# Patient Record
Sex: FEMALE | Race: BLACK OR AFRICAN AMERICAN | NOT HISPANIC OR LATINO | Employment: UNEMPLOYED | URBAN - METROPOLITAN AREA
[De-identification: names, ages, dates, MRNs, and addresses within clinical notes are randomized per-mention and may not be internally consistent; named-entity substitution may affect disease eponyms.]

---

## 2022-11-10 DIAGNOSIS — N63.0 MASS OF BREAST, UNSPECIFIED LATERALITY: Primary | ICD-10-CM

## 2022-12-16 NOTE — PROGRESS NOTES
"[facility] [place] [facility]  Breast Surgical Oncology  New Patient Office Visit - H&P      Referring Provider: [facility]  PCP: Primary Doctor No     Chief Complaint:   Chief Complaint   Patient presents with    Breast Cyst      Subjective:     HPI:  Patsy Suarez is a 47 y.o. female who presents on 2022 for evaluation of  a left breast cyst and dermal cysts .     She first noticed 2 lumps in the skin of the left breast a few months ago. She presented to her OBGYN and was sent for evaluation with breast imaging at [facility], which revealed findings consistent with 2 dermal cysts. Since this imaging, she is also now able to palpate more along the inferior portion of her sternum. She reports a history of similar cyst on the left upper arm which has been I&Ded a few times. She currently denies other any breast issues including rashes, redness, pain, swelling, nipple discharge, or new lumps/masses.     Of note, an incidental benign cyst was also found on imaging in the left breast 2:00 9 cmfn. Her lifetime risk for breast cancer was calculated using the Tyrer-Cuzick model and found to be 14.8% (average). She does have a strong family history of colon/stomach cancer.      Imagin2022 SCR MG at [facility] - There is a 0.5 cm ovoid shaped mass in the left breast at 2-3:00 which is probably a cyst. US recommended to evaluate this. Will also evaluate patient reports of bumps in the extreme medial left breast over the sternum.  2022 L Breast US at [facility] to evaluate mass seen in the left breast on screening MG and to evaluate to "bumps" under the skin of the medial left breast - 1. There has benign cyst in the posterior left breast at 2:00 9 cm from the nipple measuring 0.7 x 0.4 x 0.9 cm corresponding to the mass seen mammographically at this location.  2.  There is a benign cyst associated with the dermis located at 8:00 12 cm from the nipple corresponding to 1 of the palpable " masses indicated by patient.  It measures 0.8 x 0.5 x 0.8 cm.  3.  There has benign appearing dermal cyst located in the midline over the sternum corresponding to the other palpable mass indicated by patient.  It measures 0.3 x 0.2 x 0.3 cm.    Pathology:  None    OB/GYN History:  Age at Menarche Onset: 12  Menopausal Status: perimenopausal, LMP: Patient's last menstrual period was 2022.  Hysterectomy/Oophorectomy: no  Hormonal birth control (duration): on and off from age 34 - 40  Pregnancy History:   Age at first live birth: n/a  Hormone Replacement Therapy: No, none    Other:  # of breast biopsies (when and pathology results): none  MG breast density: No breast composition recorded.   Prior thoracic RT: none  Genetic testing: none  Ashkenazi Catholic descent: No    Family History:  Family History   Problem Relation Age of Onset    Diabetes Mother     Hypertension Mother     Thyroid disease Mother     Hypertension Father     Diabetes Father     Cancer Maternal Grandmother         stomach or colon; 50s    Cancer Paternal Grandmother         stomach or colon; 60s or 70s    Cancer Maternal Aunt         stomach or colon; 70s    Cancer Maternal Aunt         stomach or colon; late 50s early 60s    Breast cancer Maternal Cousin         53 or 54    Cancer Paternal Cousin         Stomach or colon; 70s    Cancer Paternal Cousin         Stomach or colon; 70s or 80s    Prostate cancer Maternal Uncle         late 60s early 70s    Prostate cancer Maternal Uncle 84        Patient History:  Past Medical History:   Diagnosis Date    Arthritis     Asthma     Hypertension        Past Surgical History:   Procedure Laterality Date    OVARIAN CYST SURGERY         Social History     Socioeconomic History    Marital status:    Tobacco Use    Smoking status: Former     Types: Cigarettes    Smokeless tobacco: Never    Tobacco comments:     On and off light smoke         There is no immunization history on file for this  patient.    Medications/Allergies:    Current Outpatient Medications:     albuterol (PROVENTIL/VENTOLIN HFA) 90 mcg/actuation inhaler, Inhale 2 puffs into the lungs., Disp: , Rfl:     cholecalciferol, vitamin D3, 1,250 mcg (50,000 unit) Tab, Take 1,250 mcg by mouth., Disp: , Rfl:     clotrimazole (LOTRIMIN) 1 % cream, Apply topically., Disp: , Rfl:     fluticasone furoate-vilanteroL (BREO) 100-25 mcg/dose diskus inhaler, Inhale 1 puff into the lungs., Disp: , Rfl:     fluticasone propionate (FLONASE) 50 mcg/actuation nasal spray, 1 spray by Nasal route., Disp: , Rfl:     ibuprofen (ADVIL,MOTRIN) 800 MG tablet, Take 800 mg by mouth., Disp: , Rfl:     ketoconazole (NIZORAL) 2 % cream, Apply topically., Disp: , Rfl:     lisinopriL-hydrochlorothiazide (PRINZIDE,ZESTORETIC) 20-25 mg Tab, Take 1 tablet by mouth once daily., Disp: , Rfl:     metoprolol succinate (TOPROL-XL) 50 MG 24 hr tablet, Take 50 mg by mouth., Disp: , Rfl:     montelukast (SINGULAIR) 10 mg tablet, Take 10 mg by mouth., Disp: , Rfl:     terbinafine HCL (LAMISIL) 250 mg tablet, Take 250 mg by mouth., Disp: , Rfl:      Review of patient's allergies indicates:   Allergen Reactions    Contrast media      INTRAVASCULAR CONTRAST MEDIA       Review of Systems:  Pertinent items are noted in HPI.     Objective:     Vitals:  Vitals:    12/21/22 0947   BP: (!) 132/90   Pulse: 78   Resp: 18   Temp: 97.9 °F (36.6 °C)       Body mass index is 44.51 kg/m².     Physical Exam:  General: The patient is awake, alert and oriented times three. The patient is well nourished and in no acute distress.  Neck: There is no evidence of palpable cervical, supraclavicular or axillary adenopathy. The neck is supple. The thyroid is not enlarged.  Musculoskeletal: The patient has a normal range of motion of her bilateral upper extremities.  Chest: Examination of the chest wall fails to reveal any obvious abnormalities.  The lungs are clear to auscultation bilaterally without rales,  rhonchi, or wheezing.  Cardiovascular: The heart has a regular rate and rhythm without murmurs, gallops or rubs.  Breast:   Right: Multiple skin tags present noted to the skin of the trunk, largest on the right breast axillary tail. Examination of right breast fails to reveal any dominant masses or areas of significant focal nodularity. The nipple is everted without evidence of discharge. There is no skin dimpling with movement of the pectoralis. There is no significant skin changes overlying the breast.   Left: In the medial portion of the inframammary fold, I am able to visualize a 3-4 mm inclusion cyst. Just medial to this is an area where she reports prior lump was present. Post inflammatory hyperpigmentation noted here. Similar palpable lumps also noted at the inferior portion of the sternum consistent with inclusion cysts. Examination of the left breast fails to reveal any dominant masses or areas of significant focal nodularity. The nipple is everted without evidence of discharge. There is no skin dimpling with movement of the pectoralis. There are no significant skin changes overlying the breast.  Abdomen: The abdomen is soft, flat, nontender and nondistended with no palpable masses or organomegaly.  Integumentary: no rashes or skin lesions present  Neurologic: cranial nerves intact, no signs of peripheral neurological deficit, motor/sensory function intact    Assessment and Plan:     There is no problem list on file for this patient.       Patsy was seen today for breast cyst.    Diagnoses and all orders for this visit:    Mass of breast, unspecified laterality  -     Ambulatory referral/consult to Breast Surgery          Plan:       Referral to [facility] for skin tags and also for multiple epidermal inclusion cysts of the left breast, sternum, left upper arm.     Referral to genetic counseling for strong family history of colon cancer.    RTC in 6 months to re-evaluate risk for breast cancer  and re-evaluate breast concerns. Patient was told that if she sees both genetics and dermatology sooner than this visit, she is welcome to call and reschedule appointment to sooner date.      All of her questions were answered. She was advised to call if she develops any questions or concerns.    Ely Catherine PA-C            --------------------------------------------------------------------------------------------------------------  Total time on the date of the visit ranged from 60-74 mins ([id number]). Total time includes both face-to-face and non-face-to-face time personally spent by myself on the day of the visit.    Non-face-to-face time included:  _X_ preparing to see the patient such as reviewing the patient record  _X_ obtaining and reviewing separately obtained history  _X_ independently interpreting results  _X_ documenting clinical information in electronic health record.    Face-to-face time included:  _X_ performing an appropriate history and examination  _X_ communicating results to the patient  _X_ counseling and educating the patient  __ ordering appropriate medications  _x_ ordering appropriate tests  _X_ ordering appropriate procedures (including follow-up)  _X_ answering any questions the patient had    Total Time spent on date of visit: 60 minutes

## 2022-12-21 ENCOUNTER — OFFICE VISIT (OUTPATIENT)
Dept: SURGERY | Facility: CLINIC | Age: 47
End: 2022-12-21
Payer: OTHER GOVERNMENT

## 2022-12-21 VITALS
OXYGEN SATURATION: 97 % | SYSTOLIC BLOOD PRESSURE: 132 MMHG | DIASTOLIC BLOOD PRESSURE: 90 MMHG | WEIGHT: 284.19 LBS | TEMPERATURE: 98 F | BODY MASS INDEX: 44.61 KG/M2 | HEIGHT: 67 IN | HEART RATE: 78 BPM | RESPIRATION RATE: 18 BRPM

## 2022-12-21 DIAGNOSIS — N60.02 BENIGN CYST OF LEFT BREAST: Primary | ICD-10-CM

## 2022-12-21 DIAGNOSIS — L72.9 GENERALIZED SKIN CYSTS: ICD-10-CM

## 2022-12-21 DIAGNOSIS — Z80.0 FAMILY HISTORY OF COLON CANCER: ICD-10-CM

## 2022-12-21 DIAGNOSIS — L72.0 INCLUSION CYST OF LEFT BREAST: ICD-10-CM

## 2022-12-21 PROCEDURE — 99214 OFFICE O/P EST MOD 30 MIN: CPT | Mod: PBBFAC | Performed by: PHYSICIAN ASSISTANT

## 2022-12-21 PROCEDURE — 99205 OFFICE O/P NEW HI 60 MIN: CPT | Mod: S$PBB,,, | Performed by: PHYSICIAN ASSISTANT

## 2022-12-21 PROCEDURE — 99205 PR OFFICE/OUTPT VISIT, NEW, LEVL V, 60-74 MIN: ICD-10-PCS | Mod: S$PBB,,, | Performed by: PHYSICIAN ASSISTANT

## 2022-12-21 PROCEDURE — 99999 PR PBB SHADOW E&M-EST. PATIENT-LVL IV: CPT | Mod: PBBFAC,,, | Performed by: PHYSICIAN ASSISTANT

## 2022-12-21 PROCEDURE — 99999 PR PBB SHADOW E&M-EST. PATIENT-LVL IV: ICD-10-PCS | Mod: PBBFAC,,, | Performed by: PHYSICIAN ASSISTANT

## 2022-12-21 RX ORDER — LISINOPRIL AND HYDROCHLOROTHIAZIDE 20; 25 MG/1; MG/1
1 TABLET ORAL EVERY MORNING
COMMUNITY

## 2022-12-21 RX ORDER — KETOCONAZOLE 20 MG/G
CREAM TOPICAL
COMMUNITY
Start: 2022-08-30

## 2022-12-21 RX ORDER — FLUTICASONE FUROATE AND VILANTEROL 100; 25 UG/1; UG/1
1 POWDER RESPIRATORY (INHALATION) EVERY MORNING
COMMUNITY
Start: 2022-10-10 | End: 2023-10-10

## 2022-12-21 RX ORDER — MONTELUKAST SODIUM 10 MG/1
10 TABLET ORAL NIGHTLY
COMMUNITY
Start: 2022-10-10 | End: 2023-04-08

## 2022-12-21 RX ORDER — IBUPROFEN 800 MG/1
800 TABLET ORAL EVERY 6 HOURS PRN
COMMUNITY

## 2022-12-21 RX ORDER — ALBUTEROL SULFATE 90 UG/1
2 AEROSOL, METERED RESPIRATORY (INHALATION) EVERY 6 HOURS PRN
COMMUNITY
Start: 2022-10-10 | End: 2023-04-20

## 2022-12-21 RX ORDER — CHOLECALCIFEROL (VITAMIN D3) 1250 MCG
1250 TABLET ORAL
COMMUNITY

## 2022-12-21 RX ORDER — FLUTICASONE PROPIONATE 50 MCG
1 SPRAY, SUSPENSION (ML) NASAL DAILY PRN
COMMUNITY
Start: 2022-10-10 | End: 2023-04-08

## 2022-12-21 RX ORDER — CLOTRIMAZOLE 1 %
CREAM (GRAM) TOPICAL 2 TIMES DAILY
COMMUNITY

## 2022-12-21 RX ORDER — METOPROLOL SUCCINATE 50 MG/1
50 TABLET, EXTENDED RELEASE ORAL NIGHTLY
COMMUNITY

## 2022-12-21 RX ORDER — TERBINAFINE HYDROCHLORIDE 250 MG/1
250 TABLET ORAL
COMMUNITY

## 2023-01-17 ENCOUNTER — TELEPHONE (OUTPATIENT)
Dept: HEMATOLOGY/ONCOLOGY | Facility: CLINIC | Age: 48
End: 2023-01-17
Payer: OTHER GOVERNMENT

## 2023-01-17 NOTE — TELEPHONE ENCOUNTER
I have called pt and left messages on 1/12/23, 1/13/23, 1/17/23. I would be happy schedule the pt to be seen for genetic counseling when she returns my call. My direct phone number is [phone].    Shanice Mcgrath [facility]

## 2023-03-15 DIAGNOSIS — N60.02 SINGLE CYST OF LEFT BREAST: Primary | ICD-10-CM

## 2023-03-16 ENCOUNTER — CLINICAL SUPPORT (OUTPATIENT)
Dept: RESPIRATORY THERAPY | Facility: HOSPITAL | Age: 48
End: 2023-03-16
Attending: SURGERY
Payer: OTHER GOVERNMENT

## 2023-03-16 ENCOUNTER — HOSPITAL ENCOUNTER (OUTPATIENT)
Dept: RADIOLOGY | Facility: HOSPITAL | Age: 48
Discharge: HOME | End: 2023-03-16
Attending: SURGERY
Payer: OTHER GOVERNMENT

## 2023-03-16 DIAGNOSIS — N60.02 SINGLE CYST OF LEFT BREAST: ICD-10-CM

## 2023-03-16 DIAGNOSIS — Z01.811 PRE-OP CHEST EXAM: ICD-10-CM

## 2023-03-16 PROCEDURE — 93010 EKG 12-LEAD: ICD-10-PCS | Mod: ,,, | Performed by: INTERNAL MEDICINE

## 2023-03-16 PROCEDURE — 71046 X-RAY EXAM CHEST 2 VIEWS: CPT | Mod: TC

## 2023-03-16 PROCEDURE — 93005 ELECTROCARDIOGRAM TRACING: CPT

## 2023-03-16 PROCEDURE — 93010 ELECTROCARDIOGRAM REPORT: CPT | Mod: ,,, | Performed by: INTERNAL MEDICINE

## 2023-03-16 RX ORDER — PROGESTERONE 100 MG/1
1 CAPSULE ORAL NIGHTLY
COMMUNITY
Start: 2023-03-08

## 2023-03-16 RX ORDER — GABAPENTIN 100 MG/1
100 CAPSULE ORAL 3 TIMES DAILY PRN
COMMUNITY
Start: 2023-02-16

## 2023-03-19 ENCOUNTER — ANESTHESIA EVENT (OUTPATIENT)
Dept: SURGERY | Facility: HOSPITAL | Age: 48
End: 2023-03-19
Payer: OTHER GOVERNMENT

## 2023-03-19 NOTE — ANESTHESIA PREPROCEDURE EVALUATION
03/19/2023  Patsy Suarez is a 47 y.o., female.      Pre-op Assessment    I have reviewed the Patient Summary Reports.     I have reviewed the Nursing Notes. I have reviewed the NPO Status.   I have reviewed the Medications.     Review of Systems  Anesthesia Hx:  Denies Family Hx of Anesthesia complications.   Denies Personal Hx of Anesthesia complications.   Social:  Former Smoker, Alcohol Use    Hematology/Oncology:  Hematology Normal   Oncology Normal     EENT/Dental:EENT/Dental Normal   Cardiovascular:   Hypertension ECG has been reviewed. Has had negative cardiac workup in the last two years. Denies CP extreme SOB with exertion. Can do  4METS Functional Capacity 4 METS    Pulmonary:   Asthma moderate Sleep Apnea    Renal/:  Renal/ Normal     Hepatic/GI:  Hepatic/GI Normal    Musculoskeletal:  Musculoskeletal Normal    Neurological:  Neurology Normal    Endocrine:  Endocrine Normal    Dermatological:  Skin Normal    Psych:  Psychiatric Normal           Physical Exam  General: Cooperative, Alert and Oriented  Morbid Obesity  Airway:  Mallampati: II   Mouth Opening: Normal  TM Distance: Normal  Tongue: Normal  Neck ROM: Normal ROM    Dental:  Intact        Anesthesia Plan  Type of Anesthesia, risks & benefits discussed:    Anesthesia Type: Gen Supraglottic Airway  Intra-op Monitoring Plan: Standard ASA Monitors  Post Op Pain Control Plan: multimodal analgesia  Induction:  IV  Informed Consent: Informed consent signed with the Patient and all parties understand the risks and agree with anesthesia plan.  All questions answered. Patient consented to blood products? Yes  ASA Score: 2    Ready For Surgery From Anesthesia Perspective.     .

## 2023-03-20 ENCOUNTER — HOSPITAL ENCOUNTER (OUTPATIENT)
Facility: HOSPITAL | Age: 48
Discharge: HOME | End: 2023-03-20
Attending: SURGERY | Admitting: SURGERY
Payer: OTHER GOVERNMENT

## 2023-03-20 ENCOUNTER — ANESTHESIA (OUTPATIENT)
Dept: SURGERY | Facility: HOSPITAL | Age: 48
End: 2023-03-20
Payer: OTHER GOVERNMENT

## 2023-03-20 VITALS
WEIGHT: 286 LBS | DIASTOLIC BLOOD PRESSURE: 78 MMHG | HEART RATE: 68 BPM | OXYGEN SATURATION: 98 % | BODY MASS INDEX: 44.79 KG/M2 | TEMPERATURE: 97 F | RESPIRATION RATE: 20 BRPM | SYSTOLIC BLOOD PRESSURE: 138 MMHG

## 2023-03-20 DIAGNOSIS — N60.02 SINGLE CYST OF LEFT BREAST: ICD-10-CM

## 2023-03-20 DIAGNOSIS — N60.02 CYST OF LEFT BREAST: Primary | ICD-10-CM

## 2023-03-20 LAB — B-HCG SERPL QL: NEGATIVE

## 2023-03-20 PROCEDURE — 81025 URINE PREGNANCY TEST: CPT | Performed by: SURGERY

## 2023-03-20 PROCEDURE — 36000706: Performed by: SURGERY

## 2023-03-20 PROCEDURE — 71000015 HC POSTOP RECOV 1ST HR: Performed by: SURGERY

## 2023-03-20 PROCEDURE — 25000003 PHARM REV CODE 250: Performed by: SURGERY

## 2023-03-20 PROCEDURE — 63600175 PHARM REV CODE 636 W HCPCS: Performed by: ANESTHESIOLOGY

## 2023-03-20 PROCEDURE — 36000707: Performed by: SURGERY

## 2023-03-20 PROCEDURE — 63600175 PHARM REV CODE 636 W HCPCS: Performed by: SURGERY

## 2023-03-20 PROCEDURE — 37000009 HC ANESTHESIA EA ADD 15 MINS: Performed by: SURGERY

## 2023-03-20 PROCEDURE — 00400 ANES INTEGUMENTARY SYS NOS: CPT | Performed by: SURGERY

## 2023-03-20 PROCEDURE — 63600175 PHARM REV CODE 636 W HCPCS: Performed by: NURSE ANESTHETIST, CERTIFIED REGISTERED

## 2023-03-20 PROCEDURE — 37000008 HC ANESTHESIA 1ST 15 MINUTES: Performed by: SURGERY

## 2023-03-20 PROCEDURE — 71000016 HC POSTOP RECOV ADDL HR: Performed by: SURGERY

## 2023-03-20 PROCEDURE — 71000033 HC RECOVERY, INTIAL HOUR: Performed by: SURGERY

## 2023-03-20 PROCEDURE — 63600175 PHARM REV CODE 636 W HCPCS

## 2023-03-20 PROCEDURE — 25000003 PHARM REV CODE 250

## 2023-03-20 RX ORDER — METOCLOPRAMIDE HYDROCHLORIDE 5 MG/ML
INJECTION INTRAMUSCULAR; INTRAVENOUS
Status: DISCONTINUED | OUTPATIENT
Start: 2023-03-20 | End: 2023-03-20

## 2023-03-20 RX ORDER — CEFAZOLIN SODIUM 2 G/50ML
2 SOLUTION INTRAVENOUS
Status: COMPLETED | OUTPATIENT
Start: 2023-03-20 | End: 2023-03-20

## 2023-03-20 RX ORDER — DEXAMETHASONE SODIUM PHOSPHATE 4 MG/ML
INJECTION, SOLUTION INTRA-ARTICULAR; INTRALESIONAL; INTRAMUSCULAR; INTRAVENOUS; SOFT TISSUE
Status: DISCONTINUED | OUTPATIENT
Start: 2023-03-20 | End: 2023-03-20

## 2023-03-20 RX ORDER — PROPOFOL 10 MG/ML
INJECTION, EMULSION INTRAVENOUS
Status: DISCONTINUED | OUTPATIENT
Start: 2023-03-20 | End: 2023-03-20

## 2023-03-20 RX ORDER — LIDOCAINE HCL/PF 100 MG/5ML
SYRINGE (ML) INTRAVENOUS
Status: DISCONTINUED | OUTPATIENT
Start: 2023-03-20 | End: 2023-03-20

## 2023-03-20 RX ORDER — MIDAZOLAM HYDROCHLORIDE 1 MG/ML
INJECTION INTRAMUSCULAR; INTRAVENOUS
Status: DISCONTINUED | OUTPATIENT
Start: 2023-03-20 | End: 2023-03-20

## 2023-03-20 RX ORDER — HYDROMORPHONE HYDROCHLORIDE 2 MG/ML
0.2 INJECTION, SOLUTION INTRAMUSCULAR; INTRAVENOUS; SUBCUTANEOUS EVERY 5 MIN PRN
Status: DISCONTINUED | OUTPATIENT
Start: 2023-03-20 | End: 2023-03-20

## 2023-03-20 RX ORDER — FENTANYL CITRATE 50 UG/ML
INJECTION, SOLUTION INTRAMUSCULAR; INTRAVENOUS
Status: DISCONTINUED | OUTPATIENT
Start: 2023-03-20 | End: 2023-03-20

## 2023-03-20 RX ORDER — BUPIVACAINE HYDROCHLORIDE 5 MG/ML
INJECTION, SOLUTION EPIDURAL; INTRACAUDAL
Status: DISCONTINUED | OUTPATIENT
Start: 2023-03-20 | End: 2023-03-20 | Stop reason: HOSPADM

## 2023-03-20 RX ORDER — SODIUM CHLORIDE, SODIUM LACTATE, POTASSIUM CHLORIDE, CALCIUM CHLORIDE 600; 310; 30; 20 MG/100ML; MG/100ML; MG/100ML; MG/100ML
INJECTION, SOLUTION INTRAVENOUS CONTINUOUS
Status: DISCONTINUED | OUTPATIENT
Start: 2023-03-20 | End: 2023-03-20 | Stop reason: HOSPADM

## 2023-03-20 RX ORDER — ONDANSETRON 2 MG/ML
INJECTION INTRAMUSCULAR; INTRAVENOUS
Status: DISCONTINUED | OUTPATIENT
Start: 2023-03-20 | End: 2023-03-20

## 2023-03-20 RX ORDER — SODIUM CHLORIDE 0.9 % (FLUSH) 0.9 %
10 SYRINGE (ML) INJECTION
Status: DISCONTINUED | OUTPATIENT
Start: 2023-03-20 | End: 2023-03-20 | Stop reason: HOSPADM

## 2023-03-20 RX ORDER — FENTANYL CITRATE 50 UG/ML
25 INJECTION, SOLUTION INTRAMUSCULAR; INTRAVENOUS EVERY 5 MIN PRN
Status: DISCONTINUED | OUTPATIENT
Start: 2023-03-20 | End: 2023-03-20

## 2023-03-20 RX ORDER — OXYCODONE AND ACETAMINOPHEN 5; 325 MG/1; MG/1
1 TABLET ORAL EVERY 4 HOURS PRN
Status: DISCONTINUED | OUTPATIENT
Start: 2023-03-20 | End: 2023-03-20 | Stop reason: HOSPADM

## 2023-03-20 RX ADMIN — PROPOFOL 150 MG: 10 INJECTION, EMULSION INTRAVENOUS at 08:03

## 2023-03-20 RX ADMIN — FENTANYL CITRATE 25 MCG: 50 INJECTION, SOLUTION INTRAMUSCULAR; INTRAVENOUS at 09:03

## 2023-03-20 RX ADMIN — SODIUM CHLORIDE, POTASSIUM CHLORIDE, SODIUM LACTATE AND CALCIUM CHLORIDE: 600; 310; 30; 20 INJECTION, SOLUTION INTRAVENOUS at 09:03

## 2023-03-20 RX ADMIN — Medication 100 MG: at 08:03

## 2023-03-20 RX ADMIN — FENTANYL CITRATE 50 MCG: 50 INJECTION, SOLUTION INTRAMUSCULAR; INTRAVENOUS at 09:03

## 2023-03-20 RX ADMIN — CEFAZOLIN SODIUM 2 G: 2 SOLUTION INTRAVENOUS at 08:03

## 2023-03-20 RX ADMIN — DEXAMETHASONE SODIUM PHOSPHATE 8 MG: 4 INJECTION, SOLUTION INTRA-ARTICULAR; INTRALESIONAL; INTRAMUSCULAR; INTRAVENOUS; SOFT TISSUE at 08:03

## 2023-03-20 RX ADMIN — SODIUM CHLORIDE, POTASSIUM CHLORIDE, SODIUM LACTATE AND CALCIUM CHLORIDE: 600; 310; 30; 20 INJECTION, SOLUTION INTRAVENOUS at 07:03

## 2023-03-20 RX ADMIN — MIDAZOLAM HYDROCHLORIDE 2 MG: 1 INJECTION INTRAMUSCULAR; INTRAVENOUS at 08:03

## 2023-03-20 RX ADMIN — OXYCODONE HYDROCHLORIDE AND ACETAMINOPHEN 1 TABLET: 5; 325 TABLET ORAL at 10:03

## 2023-03-20 RX ADMIN — PROPOFOL 100 MG: 10 INJECTION, EMULSION INTRAVENOUS at 09:03

## 2023-03-20 RX ADMIN — ONDANSETRON 4 MG: 2 INJECTION INTRAMUSCULAR; INTRAVENOUS at 08:03

## 2023-03-20 RX ADMIN — METOCLOPRAMIDE 10 MG: 5 INJECTION, SOLUTION INTRAMUSCULAR; INTRAVENOUS at 08:03

## 2023-03-20 NOTE — OR NURSING
Attempted to call family in patient's room. No answer. Message left with Sandra at 2nd floor nurses station.

## 2023-03-20 NOTE — ANESTHESIA POSTPROCEDURE EVALUATION
Anesthesia Post Evaluation    Patient: Patsy Suarez    Procedure(s) Performed: Procedure(s) (LRB):  EXCISION, LESION, BREAST (left breast cystic lesion inframammary fold) (Left)  REMOVAL, SKIN TAG - LEFT BREAST SKIN TAG, INFERIOR BREAST (Left)  EXCISION, LESION - LEFT BREAST INFERIOR LESION (Left)    Final Anesthesia Type: general      Patient location during evaluation: PACU  Patient participation: Yes- Able to Participate  Level of consciousness: awake  Post-procedure vital signs: reviewed and stable  Pain management: adequate  Airway patency: patent  TEVIN mitigation strategies: Multimodal analgesia  PONV status at discharge: No PONV  Anesthetic complications: no      Cardiovascular status: hemodynamically stable  Respiratory status: unassisted, room air and spontaneous ventilation  Hydration status: euvolemic  Follow-up not needed.          Vitals Value Taken Time   /65 03/20/23 0955   Temp 36.3 °C (97.3 °F) 03/20/23 0945   Pulse 70 03/20/23 0955   Resp 23 03/20/23 0955   SpO2 97 % 03/20/23 0955   Vitals shown include unvalidated device data.      Event Time   Out of Recovery 09:57:25         Pain/Nicole Score: Nicole Score: 10 (3/20/2023  9:53 AM)

## 2023-03-20 NOTE — TRANSFER OF CARE
Anesthesia Transfer of Care Note    Patient: Patsy Suarez    Procedure(s) Performed: Procedure(s) (LRB):  EXCISION, LESION, BREAST (left breast cystic lesion inframammary fold) (Left)  REMOVAL, SKIN TAG - LEFT BREAST SKIN TAG, INFERIOR BREAST (Left)  EXCISION, LESION - LEFT BREAST INFERIOR LESION (Left)    Patient location: PACU    Anesthesia Type: general    Transport from OR: Transported from OR on room air with adequate spontaneous ventilation    Post pain: adequate analgesia    Post assessment: no apparent anesthetic complications    Post vital signs: stable    Level of consciousness: awake    Nausea/Vomiting: no nausea/vomiting    Complications: none    Transfer of care protocol was followed      Last vitals:   Visit Vitals  BP (!) 156/94   Pulse 72   Temp 36.8 °C (98.3 °F) (Oral)   Resp 20   Wt 129.7 kg (286 lb)   LMP  (Approximate)   SpO2 98%   Breastfeeding No   BMI 44.79 kg/m²

## 2023-03-22 LAB — PSYCHE PATHOLOGY RESULT: NORMAL

## 2023-04-11 NOTE — DISCHARGE SUMMARY
[facility]  Discharge Note  Short Stay    Procedure(s) (LRB):  EXCISION, LESION, BREAST (left breast cystic lesion inframammary fold) (Left)  REMOVAL, SKIN TAG - LEFT BREAST SKIN TAG, INFERIOR BREAST (Left)  EXCISION, LESION - LEFT BREAST INFERIOR LESION (Left)      OUTCOME: Patient tolerated treatment/procedure well without complication and is now ready for discharge.    DISPOSITION: Home or Self Care    FINAL DIAGNOSIS:  <principal problem not specified>    FOLLOWUP: In clinic    DISCHARGE INSTRUCTIONS:    Discharge Procedure Orders   Notify your health care provider if you experience any of the following:  temperature >100.4     Notify your health care provider if you experience any of the following:  persistent nausea and vomiting or diarrhea     Notify your health care provider if you experience any of the following:  severe uncontrolled pain     Notify your health care provider if you experience any of the following:  redness, tenderness, or signs of infection (pain, swelling, redness, odor or green/yellow discharge around incision site)     Lifting restrictions   Order Comments: No lifting more than 5 pounds for 2 weeks     Activity as tolerated     Shower on day dressing removed (No bath)   Order Comments: May shower tomorrow         Clinical Reference Documents Added to Patient Instructions         Document    WOUND CARE ED (ENGLISH)            TIME SPENT ON DISCHARGE: 3 minutes

## 2023-04-20 ENCOUNTER — CLINICAL SUPPORT (OUTPATIENT)
Dept: RESPIRATORY THERAPY | Facility: HOSPITAL | Age: 48
End: 2023-04-20
Attending: ANESTHESIOLOGY
Payer: OTHER GOVERNMENT

## 2023-04-20 DIAGNOSIS — Z12.11 SCREEN FOR COLON CANCER: Primary | ICD-10-CM

## 2023-04-20 DIAGNOSIS — Z12.11 SCREEN FOR COLON CANCER: ICD-10-CM

## 2023-04-20 PROCEDURE — 93005 ELECTROCARDIOGRAM TRACING: CPT

## 2023-04-20 PROCEDURE — 93010 ELECTROCARDIOGRAM REPORT: CPT | Mod: ,,, | Performed by: INTERNAL MEDICINE

## 2023-04-20 PROCEDURE — 93010 EKG 12-LEAD: ICD-10-PCS | Mod: ,,, | Performed by: INTERNAL MEDICINE

## 2023-04-20 RX ORDER — MONTELUKAST SODIUM 10 MG/1
10 TABLET ORAL NIGHTLY
COMMUNITY
Start: 2023-04-04

## 2023-04-20 RX ORDER — ALBUTEROL SULFATE 1.25 MG/3ML
1.25 SOLUTION RESPIRATORY (INHALATION) EVERY 6 HOURS PRN
COMMUNITY

## 2023-04-21 ENCOUNTER — ANESTHESIA EVENT (OUTPATIENT)
Dept: SURGERY | Facility: HOSPITAL | Age: 48
End: 2023-04-21
Payer: OTHER GOVERNMENT

## 2023-04-21 NOTE — ANESTHESIA PREPROCEDURE EVALUATION
04/21/2023  Patsy Suarez is a 47 y.o., female.      Pre-op Assessment    I have reviewed the Patient Summary Reports.     I have reviewed the Nursing Notes. I have reviewed the NPO Status.   I have reviewed the Medications.     Review of Systems  Anesthesia Hx:  Denies Family Hx of Anesthesia complications.   Denies Personal Hx of Anesthesia complications.   Social:  Former Smoker, Alcohol Use    Hematology/Oncology:  Hematology Normal   Oncology Normal     EENT/Dental:EENT/Dental Normal   Cardiovascular:   Hypertension, well controlled ECG has been reviewed.    Pulmonary:   Asthma moderate Sleep Apnea    Renal/:  Renal/ Normal     Hepatic/GI:  Hepatic/GI Normal    Musculoskeletal:  Musculoskeletal Normal    Neurological:  Neurology Normal    Endocrine:  Endocrine Normal    Dermatological:  Skin Normal    Psych:  Psychiatric Normal           Physical Exam  General: Cooperative, Alert and Oriented    Airway:  Mallampati: II   Mouth Opening: Normal  TM Distance: Normal  Tongue: Normal  Neck ROM: Normal ROM    Dental:  Intact        Anesthesia Plan  Type of Anesthesia, risks & benefits discussed:    Anesthesia Type: Gen Natural Airway  Intra-op Monitoring Plan: Standard ASA Monitors  Post Op Pain Control Plan:   (medical reason for not using multimodal pain management)  Induction:  IV  Informed Consent: Informed consent signed with the Patient and all parties understand the risks and agree with anesthesia plan.  All questions answered. Patient consented to blood products? Yes  ASA Score: 3    Ready For Surgery From Anesthesia Perspective.     .

## 2023-04-24 ENCOUNTER — ANESTHESIA (OUTPATIENT)
Dept: SURGERY | Facility: HOSPITAL | Age: 48
End: 2023-04-24
Payer: OTHER GOVERNMENT

## 2023-04-26 ENCOUNTER — HOSPITAL ENCOUNTER (OUTPATIENT)
Facility: HOSPITAL | Age: 48
Discharge: HOME | End: 2023-04-26
Attending: SURGERY | Admitting: SURGERY
Payer: OTHER GOVERNMENT

## 2023-04-26 VITALS
SYSTOLIC BLOOD PRESSURE: 132 MMHG | TEMPERATURE: 99 F | BODY MASS INDEX: 44.95 KG/M2 | HEART RATE: 88 BPM | OXYGEN SATURATION: 97 % | RESPIRATION RATE: 18 BRPM | DIASTOLIC BLOOD PRESSURE: 73 MMHG | WEIGHT: 287 LBS

## 2023-04-26 DIAGNOSIS — Z12.11 SCREEN FOR COLON CANCER: ICD-10-CM

## 2023-04-26 DIAGNOSIS — Z12.11 SCREENING FOR COLON CANCER: Primary | ICD-10-CM

## 2023-04-26 LAB — B-HCG SERPL QL: NEGATIVE

## 2023-04-26 PROCEDURE — 63600175 PHARM REV CODE 636 W HCPCS: Performed by: NURSE ANESTHETIST, CERTIFIED REGISTERED

## 2023-04-26 PROCEDURE — 45380 COLONOSCOPY AND BIOPSY: CPT | Mod: PT | Performed by: SURGERY

## 2023-04-26 PROCEDURE — D9220A PRA ANESTHESIA: Mod: 33,,, | Performed by: NURSE ANESTHETIST, CERTIFIED REGISTERED

## 2023-04-26 PROCEDURE — 27201423 OPTIME MED/SURG SUP & DEVICES STERILE SUPPLY: Performed by: SURGERY

## 2023-04-26 PROCEDURE — 63600175 PHARM REV CODE 636 W HCPCS: Performed by: ANESTHESIOLOGY

## 2023-04-26 PROCEDURE — 81025 URINE PREGNANCY TEST: CPT | Performed by: SURGERY

## 2023-04-26 PROCEDURE — 00811 ANES LWR INTST NDSC NOS: CPT | Performed by: SURGERY

## 2023-04-26 PROCEDURE — 37000008 HC ANESTHESIA 1ST 15 MINUTES: Performed by: SURGERY

## 2023-04-26 PROCEDURE — 25000003 PHARM REV CODE 250: Performed by: NURSE ANESTHETIST, CERTIFIED REGISTERED

## 2023-04-26 PROCEDURE — D9220A PRA ANESTHESIA: ICD-10-PCS | Mod: 33,,, | Performed by: NURSE ANESTHETIST, CERTIFIED REGISTERED

## 2023-04-26 PROCEDURE — 37000009 HC ANESTHESIA EA ADD 15 MINS: Performed by: SURGERY

## 2023-04-26 RX ORDER — SODIUM CHLORIDE, SODIUM LACTATE, POTASSIUM CHLORIDE, CALCIUM CHLORIDE 600; 310; 30; 20 MG/100ML; MG/100ML; MG/100ML; MG/100ML
INJECTION, SOLUTION INTRAVENOUS CONTINUOUS
Status: ACTIVE | OUTPATIENT
Start: 2023-04-26

## 2023-04-26 RX ORDER — PROPOFOL 10 MG/ML
VIAL (ML) INTRAVENOUS
Status: DISCONTINUED | OUTPATIENT
Start: 2023-04-26 | End: 2023-04-26

## 2023-04-26 RX ORDER — GLYCOPYRROLATE 0.2 MG/ML
INJECTION INTRAMUSCULAR; INTRAVENOUS
Status: DISCONTINUED | OUTPATIENT
Start: 2023-04-26 | End: 2023-04-26

## 2023-04-26 RX ORDER — LIDOCAINE HYDROCHLORIDE 20 MG/ML
INJECTION INTRAVENOUS
Status: DISCONTINUED | OUTPATIENT
Start: 2023-04-26 | End: 2023-04-26

## 2023-04-26 RX ADMIN — LIDOCAINE HYDROCHLORIDE 20 MG: 20 INJECTION, SOLUTION INTRAVENOUS at 12:04

## 2023-04-26 RX ADMIN — PROPOFOL 50 MG: 10 INJECTION, EMULSION INTRAVENOUS at 01:04

## 2023-04-26 RX ADMIN — PROPOFOL 150 MG: 10 INJECTION, EMULSION INTRAVENOUS at 12:04

## 2023-04-26 RX ADMIN — PROPOFOL 20 MG: 10 INJECTION, EMULSION INTRAVENOUS at 12:04

## 2023-04-26 RX ADMIN — PROPOFOL 50 MG: 10 INJECTION, EMULSION INTRAVENOUS at 12:04

## 2023-04-26 RX ADMIN — GLYCOPYRROLATE 0.2 MG: 0.2 INJECTION INTRAMUSCULAR; INTRAVENOUS at 12:04

## 2023-04-26 RX ADMIN — SODIUM CHLORIDE, POTASSIUM CHLORIDE, SODIUM LACTATE AND CALCIUM CHLORIDE: 600; 310; 30; 20 INJECTION, SOLUTION INTRAVENOUS at 10:04

## 2023-04-26 NOTE — ANESTHESIA POSTPROCEDURE EVALUATION
Anesthesia Post Evaluation    Patient: Patsy Suarez    Procedure(s) Performed: Procedure(s) (LRB):  COLONOSCOPY (N/A)  COLONOSCOPY, WITH 1 OR MORE BIOPSIES    Final Anesthesia Type: general      Patient location during evaluation: OPS  Patient participation: Yes- Able to Participate  Level of consciousness: awake and alert  Post-procedure vital signs: reviewed and stable  Pain management: adequate  Airway patency: patent  TEVIN mitigation strategies: Multimodal analgesia  PONV status at discharge: No PONV  Anesthetic complications: no      Cardiovascular status: hemodynamically stable  Respiratory status: unassisted, spontaneous ventilation and room air  Hydration status: euvolemic  Follow-up not needed.  Comments: Patient to bed per self          Vitals Value Taken Time   /83 04/26/23 1016   Temp 37.2 °C (99 °F) 04/26/23 1016   Pulse 90 04/26/23 1016   Resp 18 04/26/23 1020   SpO2 97 % 04/26/23 1016         No case tracking events are documented in the log.      Pain/Nicole Score: No data recorded

## 2023-04-27 NOTE — OP NOTE
[facility]                     [address] [address]    PATIENT NAME:      JOSE JUAN RIVERA   YOB: 1975  CSN:               [id number]  MRN:               [id number]  ADMIT DATE:        04/26/2023 10:03:00  PHYSICIAN:         Mary Quintero MD                          OPERATIVE REPORT      DATE OF SURGERY:    04/26/2023 00:00:00    SURGEON:  Mary Quintero MD    PREOPERATIVE DIAGNOSES:    1. Family history of colon cancer in her father.  2. Need for age-appropriate screening colonoscopy.  3. Morbid obesity with BMI of 44.95.  4. Hidradenitis to the inframammary fold.    POSTOPERATIVE DIAGNOSES:    1. Family history of colon cancer in her father.  2. Need for age-appropriate screening colonoscopy.  3. Morbid obesity with BMI of 44.95.  4. Hidradenitis to the inframammary fold.    PROCEDURE:  Colonoscopy with biopsy, intubation of terminal ileum.    ASSIST:  OR staff.    COMPLICATIONS:  None.    FINDINGS:    1. Digital rectal exam with grade 3 internal hemorrhoids without complication.    No mass lesions palpable.  2. Scope was advanced to the cecum.  Appendiceal orifice and ileocecal valve   were both normal appearing terminal ileum intubated for about 5 cm and was   normal appearing here.  3. There was some very mild diffuse nodularity throughout most of the entirety   of the colon extending from the ascending colon throughout.  Biopsy was taken at   the hepatic flexure times multiple.  There were no distinct polyps throughout   the entirety of the colon.  The cecum, ascending colon, transverse colon,   descending, and sigmoid colon were normal, but for the nodularity noted.  The   rectum was also normal.  Retroflexion in the rectum demonstrated hemorrhoidal   bundles in appropriate position.    OPERATIVE REPORT:  The patient was brought to the endoscopy suite, placed in   left lateral decubitus  position.  Sedation was provided by IV.  Digital rectal   exam was performed with findings as noted above.  The scope was advanced through   the anorectum into the colon up to the cecum.  The appendiceal orifice and   ileocecal valves were both identified.  The ileocecal valve was intubated for   about 5 cm and this was normal appearing.  Otherwise, the scope was retrieved to   the ascending colon.  The cecum and ascending colon were evaluated closely with   the walls under distention.  There was some nodularity of the mucosa of the   entirety of the colon beginning in the ascending colon extending throughout the   rest of the distal most aspect of the colon.  Biopsy was taken of the hepatic   flexure with cold forceps times multiple.  The scope was slowly retrieved.  No   additional pathology could be identified.  There were no distinct polyps that   were identified throughout.  The scope was brought eventually into the rectum   which was normal appearing.  Retroflexion demonstrated hemorrhoidal bundles in   appropriate position.  The scope was ultimately retrieved.  The patient   tolerated procedure well.  There were no complications.        ______________________________  MD LAURA Astudillo/JOSTIN  DD:  04/26/2023  Time:  01:21PM  DT:  04/26/2023  Time:  07:47PM  Job #:  [id number]      OPERATIVE REPORT

## 2023-04-28 LAB — PSYCHE PATHOLOGY RESULT: NORMAL

## 2023-05-10 NOTE — DISCHARGE SUMMARY
[facility]  Discharge Note  Short Stay    Procedure(s) (LRB):  COLONOSCOPY (N/A)  COLONOSCOPY, WITH 1 OR MORE BIOPSIES      OUTCOME: Patient tolerated treatment/procedure well without complication and is now ready for discharge.    DISPOSITION: Home or Self Care    FINAL DIAGNOSIS:  Screen for colon cancer    FOLLOWUP: In clinic    DISCHARGE INSTRUCTIONS:    Discharge Procedure Orders   Diet Adult Regular     Notify your health care provider if you experience any of the following:  temperature >100.4     Notify your health care provider if you experience any of the following:  persistent nausea and vomiting or diarrhea     Notify your health care provider if you experience any of the following:  severe uncontrolled pain     Notify your health care provider if you experience any of the following:  redness, tenderness, or signs of infection (pain, swelling, redness, odor or green/yellow discharge around incision site)     Activity as tolerated         Clinical Reference Documents Added to Patient Instructions         Document    COLONOSCOPY DISCHARGE INSTRUCTIONS (ENGLISH)            TIME SPENT ON DISCHARGE: 3 minutes

## 2023-05-15 DIAGNOSIS — R22.1 LOCALIZED SWELLING, MASS AND LUMP, NECK: Primary | ICD-10-CM

## 2023-05-22 ENCOUNTER — HOSPITAL ENCOUNTER (OUTPATIENT)
Dept: RADIOLOGY | Facility: HOSPITAL | Age: 48
Discharge: HOME | End: 2023-05-22
Attending: SURGERY
Payer: OTHER GOVERNMENT

## 2023-05-22 DIAGNOSIS — R22.1 LOCALIZED SWELLING, MASS AND LUMP, NECK: ICD-10-CM

## 2023-05-22 PROCEDURE — 76536 US EXAM OF HEAD AND NECK: CPT | Mod: TC

## 2025-07-23 ENCOUNTER — HOSPITAL ENCOUNTER (OUTPATIENT)
Dept: INTERVENTIONAL RADIOLOGY/VASCULAR | Facility: HOSPITAL | Age: 50
Discharge: HOME | End: 2025-07-23
Attending: OTOLARYNGOLOGY
Payer: OTHER GOVERNMENT

## 2025-07-23 DIAGNOSIS — R59.0 LYMPHADENOPATHY, CERVICAL: ICD-10-CM

## 2025-07-23 DIAGNOSIS — R59.0 LAD (LYMPHADENOPATHY) OF LEFT CERVICAL REGION: Primary | Chronic | ICD-10-CM

## 2025-07-23 NOTE — DISCHARGE SUMMARY
Radiology Post-Procedure Note    Pre Op Diagnosis: LAD (lymphadenopathy) of left cervical region    Post Op Diagnosis: Same    Secondary Diagnoses:   Problem List Items Addressed This Visit       * (Principal) LAD (lymphadenopathy) of left cervical region - Primary (Chronic)     Other Visit Diagnoses         Lymphadenopathy, cervical        Relevant Orders    IR FNA with Ultrasound             Procedure: US Guided Left Cervical Node Biopsy    Procedure performed by: Terry Constantino MD    Assistant: None    Written Informed Consent Obtained: Yes    Specimen Removed: FNA x 4    Estimated Blood Loss: <10 cc    Condition: Stable    Outcome: The patient tolerated the procedure well and was without complications.    For further details please see the imaging report associated.    Disposition: Home or Self Care    Follow Up: With primary care provider    Discharge Instructions:  No discharge procedures on file.       Time Spent On Discharge: 2 minutes

## 2025-07-23 NOTE — H&P
IR Pre Procedure Note   Subjective:      Patsy Suarez is a 49 y.o. female who presents today with Left Cervical LAD. This consultation is requested for the specific conditions prompting preoperative evaluation for: Left Cervical Node Biopsy.     Planned anesthesia: local.     The following portions of the patient's history were reviewed and updated as appropriate: allergies, current medications, past family history, past medical history, past social history, past surgical history, and problem list.    Review of Systems:  Pertinent items are noted in HPI.     Past Medical History:  Past Medical History[1]     Social History:  Social History     Socioeconomic History    Marital status:    Tobacco Use    Smoking status: Former     Types: Cigarettes    Smokeless tobacco: Never    Tobacco comments:     On and off light smoke   Substance and Sexual Activity    Alcohol use: Yes     Comment: Occasionally        Medication History:  Current Outpatient Medications on File Prior to Encounter   Medication Sig Dispense Refill Last Dose/Taking    albuterol (ACCUNEB) 1.25 mg/3 mL Nebu Take 1.25 mg by nebulization every 6 (six) hours as needed. Rescue       cholecalciferol, vitamin D3, 1,250 mcg (50,000 unit) Tab Take 1,250 mcg by mouth every Monday.       clotrimazole (LOTRIMIN) 1 % cream Apply topically 2 (two) times daily.       fluticasone furoate-vilanteroL (BREO) 100-25 mcg/dose diskus inhaler Inhale 1 puff into the lungs every morning.       fluticasone-umeclidin-vilanter (TRELEGY ELLIPTA) 100-62.5-25 mcg DsDv Inhale 1 puff into the lungs every morning.       gabapentin (NEURONTIN) 100 MG capsule Take 100 mg by mouth 3 (three) times daily as needed.       ibuprofen (ADVIL,MOTRIN) 800 MG tablet Take 800 mg by mouth every 6 (six) hours as needed.       ketoconazole (NIZORAL) 2 % cream Apply topically as needed.       lisinopriL-hydrochlorothiazide (PRINZIDE,ZESTORETIC) 20-25 mg Tab Take 1 tablet by mouth every  morning.       metoprolol succinate (TOPROL-XL) 50 MG 24 hr tablet Take 50 mg by mouth every evening.       montelukast (SINGULAIR) 10 mg tablet Take 10 mg by mouth every evening.       multivitamin with minerals tablet Take 1 tablet by mouth every morning.       progesterone (PROMETRIUM) 100 MG capsule Take 1 capsule by mouth every evening.       terbinafine HCL (LAMISIL) 250 mg tablet Take 250 mg by mouth.           Allergies:  Review of patient's allergies indicates:   Allergen Reactions    Contrast media      INTRAVASCULAR CONTRAST MEDIA    Lavender          Objective:     Physical Exam:  Vital Signs: BP: ()/()   Arterial Line BP: ()/()   General: No acute distress; awake, alert, and oriented x 3  HEENT: Normocephalic, atraumatic, extraocular movements intact  Cardiac:Positive S1, S2  Respiratory: Unlabored respirations  ABD: Nontender, soft  Neurological: Grossly intact; moves all 4 extremities without difficulty  Ext: No cyanosis, clubbing, or edema    Predictors of intubation difficulty:       Imaging  The pertinent imaging was personally reviewed for procedural planning, safety, and feasibility by Dr Terry Constantino.    Lab Review   Lab Results   Component Value Date     03/16/2023    K 3.9 03/16/2023     03/16/2023    CO2 29 03/16/2023    BUN 18.0 03/16/2023    CREATININE 1.15 (H) 03/16/2023    CALCIUM 10.2 03/16/2023     Lab Results   Component Value Date    WBC 8.8 03/16/2023    HGB 11.4 (L) 03/16/2023    HCT 35.9 (L) 03/16/2023    MCV 94.2 (H) 03/16/2023     03/16/2023         Assessment:        49 y.o. female presenting with Left Cervical LAD.  The patient is being evaluated for Left Cervical Node biopsy.         Plan:        Proceed with biopsy.       [1]   Past Medical History:  Diagnosis Date    Arthritis     Asthma     Hypertension     Sleep apnea     CPAP

## (undated) DEVICE — SUT MONOCRYL 4-0 PS-2

## (undated) DEVICE — PAD ELECTROSURGICAL SPL W/CORD

## (undated) DEVICE — GLOVE PROTEXIS HYDROGEL SZ7.5

## (undated) DEVICE — SOL IRRI STRL WATER 1000ML

## (undated) DEVICE — POSITIONER HEEL FOAM CONVOLTD

## (undated) DEVICE — SUPPORT ULNA NERVE PROTECTOR

## (undated) DEVICE — ADHESIVE DERMABOND ADVANCED

## (undated) DEVICE — KIT PROBE COVER GEL 6X72IN

## (undated) DEVICE — ELECTRODE BLADE INSULATED 1 IN

## (undated) DEVICE — GLOVE PROTEXIS HYDROGEL SZ6.5

## (undated) DEVICE — GLOVE PROTEXIS BLUE LATEX 7

## (undated) DEVICE — BLANKET SNUGGLE WARM LOWER BDY

## (undated) DEVICE — GLOVE PROTEXIS LTX 6.5

## (undated) DEVICE — NDL HYPO POLYPR STD 26G 1.5IN

## (undated) DEVICE — FORCEP ENDO BIOPSY CAPTURA

## (undated) DEVICE — GOWN POLY REINF BRTH SLV XL

## (undated) DEVICE — KIT SURGICAL COLON .25 1.1OZ

## (undated) DEVICE — SUT CTD VICRYL 3-0 CR/SH

## (undated) DEVICE — Device